# Patient Record
Sex: FEMALE | Race: WHITE | NOT HISPANIC OR LATINO | Employment: FULL TIME | URBAN - METROPOLITAN AREA
[De-identification: names, ages, dates, MRNs, and addresses within clinical notes are randomized per-mention and may not be internally consistent; named-entity substitution may affect disease eponyms.]

---
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
## 2021-02-09 ENCOUNTER — TELEPHONE (OUTPATIENT)
Dept: SCHEDULING | Facility: IMAGING CENTER | Age: 21
End: 2021-02-09

## 2021-02-09 ENCOUNTER — TELEPHONE (OUTPATIENT)
Dept: MEDICAL GROUP | Facility: PHYSICIAN GROUP | Age: 21
End: 2021-02-09

## 2021-02-09 NOTE — TELEPHONE ENCOUNTER
Future Appointments       Provider Department Center    2/11/2021 3:00 PM VERO Norman [facility]        NEW PATIENT VISIT PRE-VISIT PLANNING    1.  E[employer] Patient is checked in Patient Demographics?Yes    2.  Immunizations were updated in Epic using Reconcile Outside Information activity? Yes         3.  Is this appointment scheduled as a Hospital Follow-Up? No    4.  Patient is due for the following Health Maintenance Topics:   Health Maintenance Due   Topic Date Due   • CHLAMYDIA SCREENING  2000   • IMM PNEUMOCOCCAL VACCINE: 0-64 Years (1 of 1 - PPSV23) 04/04/2007   • IMM MENINGOCOCCAL B VACCINE HEALTHY PATIENTS AGED 16 TO 23 (1 of 2 - MenB Trumenba 2-Dose Series) 04/04/2017   • IMM INFLUENZA (1) 09/01/2020     5.  Reviewed/Updated the following with patient:       •   Preferred Pharmacy? No       •   Preferred Lab? No       •   Preferred Communication? No       •   Allergies? No       •   Medications? NO       •   Social History? No       •   Family History (document living status of immediate family members and if + hx of  cancer, diabetes, hypertension, hyperlipidemia, heart attack, stroke) No    6.  Updated Care Team?       •   DME Company (gait device, O2, CPAP, etc.) NO       •   Other Specialists (eye doctor, derm, GYN, cardiology, endo, etc): NO    7.  AHA (Puls8) form printed for Provider? N/A     Left 3 VM to complete PVP, Unable to complete PVP at this time

## 2021-02-11 ENCOUNTER — TELEMEDICINE (OUTPATIENT)
Dept: MEDICAL GROUP | Facility: PHYSICIAN GROUP | Age: 21
End: 2021-02-11
Payer: OTHER GOVERNMENT

## 2021-02-11 VITALS — WEIGHT: 165 LBS | TEMPERATURE: 97.8 F | HEIGHT: 65 IN | BODY MASS INDEX: 27.49 KG/M2

## 2021-02-11 DIAGNOSIS — N93.8 DYSFUNCTIONAL UTERINE BLEEDING: ICD-10-CM

## 2021-02-11 DIAGNOSIS — E78.5 DYSLIPIDEMIA: ICD-10-CM

## 2021-02-11 DIAGNOSIS — Z76.89 ESTABLISHING CARE WITH NEW DOCTOR, ENCOUNTER FOR: ICD-10-CM

## 2021-02-11 DIAGNOSIS — Z91.018 ALLERGY TO BANANA: ICD-10-CM

## 2021-02-11 DIAGNOSIS — Z02.89 ENCOUNTER FOR COMPLETION OF FORM WITH PATIENT: ICD-10-CM

## 2021-02-11 DIAGNOSIS — Z00.00 ROUTINE HEALTH MAINTENANCE: ICD-10-CM

## 2021-02-11 DIAGNOSIS — E05.90 HYPERTHYROIDISM: ICD-10-CM

## 2021-02-11 PROBLEM — J30.9 RHINITIS, ALLERGIC: Status: RESOLVED | Noted: 2018-07-02 | Resolved: 2021-02-11

## 2021-02-11 PROBLEM — Z87.892 PERSONAL HISTORY OF ANAPHYLAXIS: Status: RESOLVED | Noted: 2018-08-03 | Resolved: 2021-02-11

## 2021-02-11 PROBLEM — H93.90 DISORDER OF EAR: Status: RESOLVED | Noted: 2018-02-23 | Resolved: 2021-02-11

## 2021-02-11 PROBLEM — Z30.430 ENCOUNTER FOR INSERTION OF INTRAUTERINE CONTRACEPTIVE DEVICE: Status: RESOLVED | Noted: 2018-03-08 | Resolved: 2021-02-11

## 2021-02-11 PROBLEM — Z86.59 HISTORY OF BEHAVIOR PROBLEM: Status: RESOLVED | Noted: 2018-09-14 | Resolved: 2021-02-11

## 2021-02-11 PROCEDURE — 99214 OFFICE O/P EST MOD 30 MIN: CPT | Mod: 95 | Performed by: NURSE PRACTITIONER

## 2021-02-11 RX ORDER — EPINEPHRINE 0.3 MG/.3ML
0.3 INJECTION SUBCUTANEOUS ONCE
Qty: 1 EACH | Refills: 0 | Status: SHIPPED | OUTPATIENT
Start: 2021-02-11 | End: 2021-02-22 | Stop reason: SDUPTHER

## 2021-02-11 ASSESSMENT — FIBROSIS 4 INDEX: FIB4 SCORE: 0.25

## 2021-02-11 NOTE — ASSESSMENT & PLAN NOTE
New to examiner, chronic for patient.  Patient reports that she had an IUD removed in September 2019.  Since then she has been having irregular bleeding.  She is requesting lab work.

## 2021-02-11 NOTE — LETTER
February 11, 2021         Patient: Lupillo Coello   YOB: 2000   Date of Visit: 2/11/2021           To Whom it May Concern:    Lupillo Coello was seen in my clinic on 2/11/2021. She may return to work on light duty until further notice.    If you have any questions or concerns, please don't hesitate to call.        Sincerely,           ELIZABETH Norman.  Electronically Signed

## 2021-02-11 NOTE — ASSESSMENT & PLAN NOTE
New to examiner, chronic for patient.  Patient has not taken medication for this issue.  Reports father has a history of hyperthyroidism.  Due for updated labs.

## 2021-02-11 NOTE — PROGRESS NOTES
Virtual Visit: New Patient   This visit was conducted via [employer] using secure and encrypted videoconferencing technology. The patient was in a private location in the state of [place].    The patient's identity was confirmed and verbal consent was obtained for this virtual visit.    Subjective:     CC:   Chief Complaint   Patient presents with   • [facility]       [facility] is a 20 y.o. female presenting to establish care and to discuss the evaluation and management of:    Hyperthyroidism  New to examiner, chronic for patient.  Patient has not taken medication for this issue.  Reports father has a history of hyperthyroidism.  Due for updated labs.    Dysfunctional uterine bleeding  New to examiner, chronic for patient.  Patient reports that she had an IUD removed in September 2019.  Since then she has been having irregular bleeding.  She is requesting lab work.    Dyslipidemia  New to the examiner, chronic for patient.  Patient has a history of dyslipidemia, not currently on medication.  Due for updated labs.      ROS  See HPI  Constitutional: Negative for fever, chills and malaise/fatigue.   HENT: Negative for congestion.    Eyes: Negative for pain.   Respiratory: Negative for cough and shortness of breath.    Cardiovascular: Negative for leg swelling.   Gastrointestinal: Negative for nausea, vomiting, abdominal pain and diarrhea.   Genitourinary: Negative for dysuria and hematuria.   Skin: Negative for rash.   Neurological: Negative for dizziness, focal weakness and headaches.   Endo/Heme/Allergies: Does not bruise/bleed easily.   Psychiatric/Behavioral: Negative for depression.  The patient is not nervous/anxious.      Allergies   Allergen Reactions   • Banana Anaphylaxis       Current medicines (including changes today)  Current Outpatient Medications   Medication Sig Dispense Refill   • EPINEPHrine (EPIPEN 2-FIDELIA) 0.3 MG/0.3ML Solution Auto-injector solution for injection Inject 0.3 mL into  "the shoulder, thigh, or buttocks one time for 1 dose. 1 Each 0     No current facility-administered medications for this visit.       She  has a past medical history of Anxiety, Bipolar 1 disorder (HCC), Depression, History of behavior problem (9/14/2018), Hyperlipidemia, Personal history of anaphylaxis (8/3/2018), and PTSD (post-traumatic stress disorder).  She  has a past surgical history that includes dental extraction(s).      Family History   Problem Relation Age of Onset   • Psychiatric Illness Mother    • Thyroid Father         hyperthyroid   • No Known Problems Sister    • No Known Problems Brother    • Heart Disease Maternal Grandmother         open heart   • No Known Problems Maternal Grandfather    • No Known Problems Paternal Grandmother    • No Known Problems Paternal Grandfather    • No Known Problems Brother    • No Known Problems Sister      Family Status   Relation Name Status   • Mo  Alive   • Fa  Alive   • Sis  Alive   • Bro  Alive   • MGMo  Alive   • MGFa  Alive   • PGMo  Alive   • PGFa  Alive   • Bro  Alive   • Sis  Alive       Patient Active Problem List    Diagnosis Date Noted   • Dyslipidemia 02/11/2021   • Anxiety state 09/14/2018   • Oppositional defiant disorder 09/14/2018   • Post traumatic stress disorder 09/14/2018   • Hyperthyroidism 03/27/2018   • Carpal tunnel syndrome, bilateral upper limbs 01/19/2018   • Dysfunctional uterine bleeding 01/19/2018   • Bipolar 1 disorder (HCC) 11/03/2016          Objective:   Temp 36.6 °C (97.8 °F) (Temporal)   Ht 1.651 m (5' 5\")   Wt 74.8 kg (165 lb)   LMP 01/11/2021   BMI 27.46 kg/m²     Physical Exam:  Constitutional: Alert, no distress, well-groomed.  Skin: No rashes in visible areas.  Eye: Round. Conjunctiva clear, lids normal. No icterus.   ENMT: Lips pink without lesions, good dentition, moist mucous membranes. Phonation normal.  Neck: No masses, no thyromegaly. Moves freely without pain.  Respiratory: Unlabored respiratory effort, no cough " or audible wheeze  Psych: Alert and oriented x3, normal affect and mood.       Assessment and Plan:   The following treatment plan was discussed:     1. Establishing care with new doctor, encounter for    2. Hyperthyroidism  New to examiner, chronic for patient.  Due for updated labs.  - TSH WITH REFLEX TO FT4; Future    3. Dyslipidemia  New to examiner, chronic for patient.  Due for updated labs.  - Comp Metabolic Panel; Future  - Lipid Profile; Future    4. 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  Reports on anaphylactic reaction.  She has had to use an EpiPen in the past and currently does not have one.  Order placed.  - EPINEPHrine (EPIPEN 2-FIDELIA) 0.3 MG/0.3ML Solution Auto-injector solution for injection; Inject 0.3 mL into the shoulder, thigh, or buttocks one time for 1 dose.  Dispense: 1 Each; Refill: 0    5. Dysfunctional uterine bleeding  New to examiner, chronic for patient.  Due for updated labs.   - FSH/LH; Future  - TSH WITH REFLEX TO FT4; Future  - CBC WITH DIFFERENTIAL; Future  - Comp Metabolic Panel; Future    6. Routine health maintenance  - TSH WITH REFLEX TO FT4; Future  - CBC WITH DIFFERENTIAL; Future  - Comp Metabolic Panel; Future  - Lipid Profile; Future    7. Encounter for completion of form with patient  Patient works at [employer].  She reports having pain with numbness in her hands and she is unable to do heavy lifting.  She is requesting a letter for light duty.  Patient notified that if she needs Workmen's Comp., that she needs to go to urgent care.  If she needs [place] paperwork done, she will need a separate appointment.    Follow-up: Return for Follow up Labs.       Please note that this dictation was created using voice recognition software. I have worked with consultants from the vendor as well as technical experts from [employer] to optimize the interface. I have made every reasonable attempt to correct obvious errors, but I expect that there are errors of grammar and  possibly content that I did not discover before finalizing the note.

## 2021-02-11 NOTE — ASSESSMENT & PLAN NOTE
New to the examiner, chronic for patient.  Patient has a history of dyslipidemia, not currently on medication.  Due for updated labs.

## 2021-02-22 ENCOUNTER — TELEPHONE (OUTPATIENT)
Dept: MEDICAL GROUP | Facility: PHYSICIAN GROUP | Age: 21
End: 2021-02-22

## 2021-02-22 DIAGNOSIS — Z91.018 ALLERGY TO BANANA: ICD-10-CM

## 2021-02-22 RX ORDER — EPINEPHRINE 0.3 MG/.3ML
0.3 INJECTION SUBCUTANEOUS ONCE
Qty: 1 EACH | Refills: 0 | Status: SHIPPED | OUTPATIENT
Start: 2021-02-22 | End: 2021-02-22

## 2021-02-22 NOTE — PROGRESS NOTES
Requested Prescriptions     Signed Prescriptions Disp Refills   • EPINEPHrine (EPIPEN 2-FIDELIA) 0.3 MG/0.3ML Solution Auto-injector solution for injection 1 Each 0     Sig: Inject 0.3 mL into the shoulder, thigh, or buttocks one time for 1 dose.       ELIZABETH Norman.    Patient requesting EpiPen sent to a different pharmacy.

## 2021-02-22 NOTE — TELEPHONE ENCOUNTER
1. Caller Name:Isai Coello                             Call Back Number: [phone] (home)         How would the patient prefer to be contacted with a response: Phone call do NOT leave a detailed message    Lupillo is requesting to have her Epi Pen to be moved to [facility] in [place], please advise

## 2021-02-24 ENCOUNTER — HOSPITAL ENCOUNTER (OUTPATIENT)
Dept: LAB | Facility: MEDICAL CENTER | Age: 21
End: 2021-02-24
Attending: NURSE PRACTITIONER
Payer: OTHER GOVERNMENT

## 2021-02-24 DIAGNOSIS — E78.5 DYSLIPIDEMIA: ICD-10-CM

## 2021-02-24 DIAGNOSIS — N93.8 DYSFUNCTIONAL UTERINE BLEEDING: ICD-10-CM

## 2021-02-24 DIAGNOSIS — Z00.00 ROUTINE HEALTH MAINTENANCE: ICD-10-CM

## 2021-02-24 DIAGNOSIS — E05.90 HYPERTHYROIDISM: ICD-10-CM

## 2021-02-24 LAB
ALBUMIN SERPL BCP-MCNC: 4.5 G/DL (ref 3.2–4.9)
ALBUMIN/GLOB SERPL: 1.6 G/DL
ALP SERPL-CCNC: 77 U/L (ref 30–99)
ALT SERPL-CCNC: 22 U/L (ref 2–50)
ANION GAP SERPL CALC-SCNC: 10 MMOL/L (ref 7–16)
AST SERPL-CCNC: 21 U/L (ref 12–45)
BASOPHILS # BLD AUTO: 0.5 % (ref 0–1.8)
BASOPHILS # BLD: 0.03 K/UL (ref 0–0.12)
BILIRUB SERPL-MCNC: 0.4 MG/DL (ref 0.1–1.5)
BUN SERPL-MCNC: 13 MG/DL (ref 8–22)
CALCIUM SERPL-MCNC: 9.1 MG/DL (ref 8.4–10.2)
CHLORIDE SERPL-SCNC: 104 MMOL/L (ref 96–112)
CHOLEST SERPL-MCNC: 176 MG/DL (ref 100–199)
CO2 SERPL-SCNC: 23 MMOL/L (ref 20–33)
CREAT SERPL-MCNC: 0.54 MG/DL (ref 0.5–1.4)
EOSINOPHIL # BLD AUTO: 0.07 K/UL (ref 0–0.51)
EOSINOPHIL NFR BLD: 1.1 % (ref 0–6.9)
ERYTHROCYTE [DISTWIDTH] IN BLOOD BY AUTOMATED COUNT: 40.1 FL (ref 35.9–50)
FASTING STATUS PATIENT QL REPORTED: NORMAL
GLOBULIN SER CALC-MCNC: 2.9 G/DL (ref 1.9–3.5)
GLUCOSE SERPL-MCNC: 91 MG/DL (ref 65–99)
HCT VFR BLD AUTO: 42.3 % (ref 37–47)
HDLC SERPL-MCNC: 40 MG/DL
HGB BLD-MCNC: 14.2 G/DL (ref 12–16)
IMM GRANULOCYTES # BLD AUTO: 0.02 K/UL (ref 0–0.11)
IMM GRANULOCYTES NFR BLD AUTO: 0.3 % (ref 0–0.9)
LDLC SERPL CALC-MCNC: 116 MG/DL
LYMPHOCYTES # BLD AUTO: 1.95 K/UL (ref 1–4.8)
LYMPHOCYTES NFR BLD: 31.1 % (ref 22–41)
MCH RBC QN AUTO: 30.3 PG (ref 27–33)
MCHC RBC AUTO-ENTMCNC: 33.6 G/DL (ref 33.6–35)
MCV RBC AUTO: 90.4 FL (ref 81.4–97.8)
MONOCYTES # BLD AUTO: 0.41 K/UL (ref 0–0.85)
MONOCYTES NFR BLD AUTO: 6.5 % (ref 0–13.4)
NEUTROPHILS # BLD AUTO: 3.8 K/UL (ref 2–7.15)
NEUTROPHILS NFR BLD: 60.5 % (ref 44–72)
NRBC # BLD AUTO: 0 K/UL
NRBC BLD-RTO: 0 /100 WBC
PLATELET # BLD AUTO: 263 K/UL (ref 164–446)
PMV BLD AUTO: 9.2 FL (ref 9–12.9)
POTASSIUM SERPL-SCNC: 3.7 MMOL/L (ref 3.6–5.5)
PROT SERPL-MCNC: 7.4 G/DL (ref 6–8.2)
RBC # BLD AUTO: 4.68 M/UL (ref 4.2–5.4)
SODIUM SERPL-SCNC: 137 MMOL/L (ref 135–145)
TRIGL SERPL-MCNC: 101 MG/DL (ref 0–149)
TSH SERPL DL<=0.005 MIU/L-ACNC: 0.38 UIU/ML (ref 0.38–5.33)
WBC # BLD AUTO: 6.3 K/UL (ref 4.8–10.8)

## 2021-02-24 PROCEDURE — 80061 LIPID PANEL: CPT

## 2021-02-24 PROCEDURE — 83002 ASSAY OF GONADOTROPIN (LH): CPT

## 2021-02-24 PROCEDURE — 85025 COMPLETE CBC W/AUTO DIFF WBC: CPT

## 2021-02-24 PROCEDURE — 83001 ASSAY OF GONADOTROPIN (FSH): CPT

## 2021-02-24 PROCEDURE — 36415 COLL VENOUS BLD VENIPUNCTURE: CPT

## 2021-02-24 PROCEDURE — 84443 ASSAY THYROID STIM HORMONE: CPT

## 2021-02-24 PROCEDURE — 80053 COMPREHEN METABOLIC PANEL: CPT

## 2021-02-25 LAB
FSH SERPL-ACNC: 4.4 MIU/ML
LH SERPL-ACNC: 4.7 IU/L

## 2021-05-03 ENCOUNTER — HOSPITAL ENCOUNTER (EMERGENCY)
Facility: MEDICAL CENTER | Age: 21
End: 2021-05-03
Attending: EMERGENCY MEDICINE
Payer: OTHER GOVERNMENT

## 2021-05-03 ENCOUNTER — APPOINTMENT (OUTPATIENT)
Dept: RADIOLOGY | Facility: MEDICAL CENTER | Age: 21
End: 2021-05-03
Attending: EMERGENCY MEDICINE
Payer: OTHER GOVERNMENT

## 2021-05-03 VITALS
WEIGHT: 161.16 LBS | DIASTOLIC BLOOD PRESSURE: 67 MMHG | HEART RATE: 70 BPM | SYSTOLIC BLOOD PRESSURE: 125 MMHG | TEMPERATURE: 97.7 F | BODY MASS INDEX: 25.9 KG/M2 | OXYGEN SATURATION: 82 % | HEIGHT: 66 IN | RESPIRATION RATE: 16 BRPM

## 2021-05-03 DIAGNOSIS — I73.00 RAYNAUD'S PHENOMENON WITHOUT GANGRENE: ICD-10-CM

## 2021-05-03 PROCEDURE — 99283 EMERGENCY DEPT VISIT LOW MDM: CPT

## 2021-05-03 PROCEDURE — 93922 UPR/L XTREMITY ART 2 LEVELS: CPT

## 2021-05-03 ASSESSMENT — FIBROSIS 4 INDEX: FIB4 SCORE: 0.34

## 2021-05-03 ASSESSMENT — PAIN DESCRIPTION - DESCRIPTORS: DESCRIPTORS: OTHER (COMMENT)
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
## 2021-05-03 NOTE — ED NOTES
Discharge paperwork given to pt, all belongings accounted for, pt able to ambulate with steady gait to the ER exit
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
## 2021-12-07 ENCOUNTER — OFFICE VISIT (OUTPATIENT)
Dept: MEDICAL GROUP | Facility: PHYSICIAN GROUP | Age: 21
End: 2021-12-07
Payer: COMMERCIAL

## 2021-12-07 VITALS
TEMPERATURE: 97.9 F | DIASTOLIC BLOOD PRESSURE: 64 MMHG | HEIGHT: 65 IN | SYSTOLIC BLOOD PRESSURE: 120 MMHG | OXYGEN SATURATION: 97 % | HEART RATE: 107 BPM | BODY MASS INDEX: 25.33 KG/M2 | WEIGHT: 152 LBS

## 2021-12-07 DIAGNOSIS — F31.9 BIPOLAR 1 DISORDER (HCC): ICD-10-CM

## 2021-12-07 DIAGNOSIS — I73.00 RAYNAUD'S PHENOMENON WITHOUT GANGRENE: ICD-10-CM

## 2021-12-07 DIAGNOSIS — F43.10 POST TRAUMATIC STRESS DISORDER: ICD-10-CM

## 2021-12-07 DIAGNOSIS — M62.830 BACK SPASM: ICD-10-CM

## 2021-12-07 DIAGNOSIS — F91.3 OPPOSITIONAL DEFIANT DISORDER: ICD-10-CM

## 2021-12-07 DIAGNOSIS — Z91.018 ALLERGY TO BANANA: ICD-10-CM

## 2021-12-07 DIAGNOSIS — F41.1 ANXIETY STATE: ICD-10-CM

## 2021-12-07 LAB
INT CON NEG: NORMAL
INT CON POS: NORMAL
POC URINE PREGNANCY TEST: NEGATIVE

## 2021-12-07 PROCEDURE — 99214 OFFICE O/P EST MOD 30 MIN: CPT | Performed by: NURSE PRACTITIONER

## 2021-12-07 PROCEDURE — 81025 URINE PREGNANCY TEST: CPT | Performed by: NURSE PRACTITIONER

## 2021-12-07 RX ORDER — AMLODIPINE BESYLATE 5 MG/1
5 TABLET ORAL DAILY
Qty: 90 TABLET | Refills: 0 | Status: SHIPPED | OUTPATIENT
Start: 2021-12-07 | End: 2022-01-27

## 2021-12-07 RX ORDER — EPINEPHRINE 0.3 MG/.3ML
0.3 INJECTION SUBCUTANEOUS ONCE
Qty: 2 EACH | Refills: 2 | Status: SHIPPED | OUTPATIENT
Start: 2021-12-07 | End: 2021-12-07

## 2021-12-07 RX ORDER — CHLORAL HYDRATE 500 MG
1000 CAPSULE ORAL
COMMUNITY

## 2021-12-07 RX ORDER — NAPROXEN 500 MG/1
500 TABLET ORAL 2 TIMES DAILY WITH MEALS
COMMUNITY
End: 2021-12-07 | Stop reason: SDUPTHER

## 2021-12-07 RX ORDER — CYCLOBENZAPRINE HCL 5 MG
5 TABLET ORAL NIGHTLY PRN
Qty: 90 TABLET | Refills: 0 | Status: SHIPPED | OUTPATIENT
Start: 2021-12-07

## 2021-12-07 RX ORDER — CYCLOBENZAPRINE HCL 5 MG
5-10 TABLET ORAL 3 TIMES DAILY PRN
COMMUNITY
End: 2021-12-07 | Stop reason: SDUPTHER

## 2021-12-07 RX ORDER — NAPROXEN 500 MG/1
500 TABLET ORAL 2 TIMES DAILY WITH MEALS
Qty: 60 TABLET | Refills: 11 | Status: SHIPPED | OUTPATIENT
Start: 2021-12-07

## 2021-12-07 ASSESSMENT — FIBROSIS 4 INDEX: FIB4 SCORE: 0.34
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
## 2021-12-07 NOTE — LETTER
December 7, 2021         Patient: Lupillo Coello   YOB: 2000   Date of Visit: 12/7/2021           To Whom it May Concern:    Lupillo Coello was seen in my clinic on 12/7/2021. She may continue to work with current accommodations provided. She will follow up with PCP in 2 weeks to review and complete reasonable accomodation paperwork if indicated. Please excuse work missed on: 12/2/2021, 11/25/2021, 11/17-11/20/2021.    If you have any questions or concerns, please don't hesitate to call.        Sincerely,           ELIZABETH Norman.  Electronically Signed
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
## 2021-12-13 ENCOUNTER — TELEPHONE (OUTPATIENT)
Dept: MEDICAL GROUP | Facility: PHYSICIAN GROUP | Age: 21
End: 2021-12-13
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
## 2021-12-13 NOTE — TELEPHONE ENCOUNTER
I called and left a message for the patient that she needed to reschedule her appointment to a virtual appointment this week in order to have Brunlida deal with her multiple issues.  I provided the phone number for Central Scheduling and myself.

## 2021-12-15 ENCOUNTER — TELEMEDICINE (OUTPATIENT)
Dept: MEDICAL GROUP | Facility: PHYSICIAN GROUP | Age: 21
End: 2021-12-15
Payer: COMMERCIAL

## 2021-12-15 VITALS — HEIGHT: 65 IN | WEIGHT: 152 LBS | BODY MASS INDEX: 25.33 KG/M2

## 2021-12-15 DIAGNOSIS — Z02.89 ENCOUNTER FOR COMPLETION OF FORM WITH PATIENT: ICD-10-CM

## 2021-12-15 DIAGNOSIS — I73.00 RAYNAUD'S PHENOMENON WITHOUT GANGRENE: ICD-10-CM

## 2021-12-15 DIAGNOSIS — M62.830 BACK SPASM: ICD-10-CM

## 2021-12-15 DIAGNOSIS — G56.03 CARPAL TUNNEL SYNDROME, BILATERAL UPPER LIMBS: ICD-10-CM

## 2021-12-15 PROCEDURE — 99214 OFFICE O/P EST MOD 30 MIN: CPT | Mod: 95 | Performed by: NURSE PRACTITIONER

## 2021-12-15 ASSESSMENT — FIBROSIS 4 INDEX: FIB4 SCORE: 0.34

## 2021-12-15 NOTE — LETTER
December 15, 2021         Patient: Lupillo Coello   YOB: 2000   Date of Visit: 12/15/2021           To Whom it May Concern:     Lupillo Coello was seen in my clinic on 12/15/2021. Please excuse her absence from work on 12/10/2021 and 12/11/2021. She received COVID 19 vaccine, influenza vaccine and menactra vaccine on 12/8/2021 and experienced a reaction.    If you have any questions or concerns, please don't hesitate to call.        Sincerely,           VEOR Norman  Electronically Signed
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
## 2021-12-16 NOTE — ASSESSMENT & PLAN NOTE
Chronic, ongoing.  Patient started amlodipine 5 mg, initially 2.5 mg daily 1 week ago.  Reports that the bilateral hand and feet coldness and color changes have improved since starting the medication, but she feels that she wakes up in her hands and feet are cold, she is interested in trying half dose twice daily.  She does feel warmer with the medications.  Wanted to note that iron deficiency runs in her family.  She does continue to have aching and numbness in bilateral hands when waking up, she does continue to use bilateral wrist braces while sleeping.
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
## 2022-01-20 ENCOUNTER — HOSPITAL ENCOUNTER (EMERGENCY)
Facility: MEDICAL CENTER | Age: 22
End: 2022-01-20
Attending: STUDENT IN AN ORGANIZED HEALTH CARE EDUCATION/TRAINING PROGRAM
Payer: COMMERCIAL

## 2022-01-20 VITALS
RESPIRATION RATE: 18 BRPM | WEIGHT: 157.19 LBS | BODY MASS INDEX: 26.19 KG/M2 | HEART RATE: 75 BPM | HEIGHT: 65 IN | OXYGEN SATURATION: 98 % | SYSTOLIC BLOOD PRESSURE: 124 MMHG | TEMPERATURE: 98.2 F | DIASTOLIC BLOOD PRESSURE: 80 MMHG

## 2022-01-20 DIAGNOSIS — M79.10 MYALGIA: ICD-10-CM

## 2022-01-20 DIAGNOSIS — R19.7 DIARRHEA OF PRESUMED INFECTIOUS ORIGIN: ICD-10-CM

## 2022-01-20 DIAGNOSIS — N30.00 ACUTE CYSTITIS WITHOUT HEMATURIA: ICD-10-CM

## 2022-01-20 LAB
ALBUMIN SERPL BCP-MCNC: 4.7 G/DL (ref 3.2–4.9)
ALBUMIN/GLOB SERPL: 1.6 G/DL
ALP SERPL-CCNC: 91 U/L (ref 30–99)
ALT SERPL-CCNC: 16 U/L (ref 2–50)
ANION GAP SERPL CALC-SCNC: 13 MMOL/L (ref 7–16)
APPEARANCE UR: CLEAR
AST SERPL-CCNC: 18 U/L (ref 12–45)
BACTERIA #/AREA URNS HPF: ABNORMAL /HPF
BASOPHILS # BLD AUTO: 0.5 % (ref 0–1.8)
BASOPHILS # BLD: 0.04 K/UL (ref 0–0.12)
BILIRUB SERPL-MCNC: 0.2 MG/DL (ref 0.1–1.5)
BILIRUB UR QL STRIP.AUTO: NEGATIVE
BUN SERPL-MCNC: 11 MG/DL (ref 8–22)
CALCIUM SERPL-MCNC: 9.5 MG/DL (ref 8.5–10.5)
CHLORIDE SERPL-SCNC: 104 MMOL/L (ref 96–112)
CO2 SERPL-SCNC: 22 MMOL/L (ref 20–33)
COLOR UR: YELLOW
CREAT SERPL-MCNC: 0.72 MG/DL (ref 0.5–1.4)
EOSINOPHIL # BLD AUTO: 0.1 K/UL (ref 0–0.51)
EOSINOPHIL NFR BLD: 1.2 % (ref 0–6.9)
EPI CELLS #/AREA URNS HPF: ABNORMAL /HPF
ERYTHROCYTE [DISTWIDTH] IN BLOOD BY AUTOMATED COUNT: 41.7 FL (ref 35.9–50)
GLOBULIN SER CALC-MCNC: 3 G/DL (ref 1.9–3.5)
GLUCOSE SERPL-MCNC: 95 MG/DL (ref 65–99)
GLUCOSE UR STRIP.AUTO-MCNC: NEGATIVE MG/DL
HCG SERPL QL: NEGATIVE
HCT VFR BLD AUTO: 43.2 % (ref 37–47)
HGB BLD-MCNC: 14.7 G/DL (ref 12–16)
HYALINE CASTS #/AREA URNS LPF: ABNORMAL /LPF
IMM GRANULOCYTES # BLD AUTO: 0.02 K/UL (ref 0–0.11)
IMM GRANULOCYTES NFR BLD AUTO: 0.2 % (ref 0–0.9)
KETONES UR STRIP.AUTO-MCNC: NEGATIVE MG/DL
LEUKOCYTE ESTERASE UR QL STRIP.AUTO: ABNORMAL
LIPASE SERPL-CCNC: 35 U/L (ref 11–82)
LYMPHOCYTES # BLD AUTO: 1.72 K/UL (ref 1–4.8)
LYMPHOCYTES NFR BLD: 21 % (ref 22–41)
MCH RBC QN AUTO: 30.8 PG (ref 27–33)
MCHC RBC AUTO-ENTMCNC: 34 G/DL (ref 33.6–35)
MCV RBC AUTO: 90.6 FL (ref 81.4–97.8)
MICRO URNS: ABNORMAL
MONOCYTES # BLD AUTO: 0.63 K/UL (ref 0–0.85)
MONOCYTES NFR BLD AUTO: 7.7 % (ref 0–13.4)
NEUTROPHILS # BLD AUTO: 5.7 K/UL (ref 2–7.15)
NEUTROPHILS NFR BLD: 69.4 % (ref 44–72)
NITRITE UR QL STRIP.AUTO: POSITIVE
NRBC # BLD AUTO: 0 K/UL
NRBC BLD-RTO: 0 /100 WBC
PH UR STRIP.AUTO: 6.5 [PH] (ref 5–8)
PLATELET # BLD AUTO: 324 K/UL (ref 164–446)
PMV BLD AUTO: 9.3 FL (ref 9–12.9)
POTASSIUM SERPL-SCNC: 4.2 MMOL/L (ref 3.6–5.5)
PROT SERPL-MCNC: 7.7 G/DL (ref 6–8.2)
PROT UR QL STRIP: NEGATIVE MG/DL
RBC # BLD AUTO: 4.77 M/UL (ref 4.2–5.4)
RBC # URNS HPF: ABNORMAL /HPF
RBC UR QL AUTO: NEGATIVE
SODIUM SERPL-SCNC: 139 MMOL/L (ref 135–145)
SP GR UR STRIP.AUTO: 1.02
UROBILINOGEN UR STRIP.AUTO-MCNC: 0.2 MG/DL
WBC # BLD AUTO: 8.2 K/UL (ref 4.8–10.8)
WBC #/AREA URNS HPF: ABNORMAL /HPF

## 2022-01-20 PROCEDURE — 84703 CHORIONIC GONADOTROPIN ASSAY: CPT

## 2022-01-20 PROCEDURE — 80053 COMPREHEN METABOLIC PANEL: CPT

## 2022-01-20 PROCEDURE — 85025 COMPLETE CBC W/AUTO DIFF WBC: CPT

## 2022-01-20 PROCEDURE — 81001 URINALYSIS AUTO W/SCOPE: CPT

## 2022-01-20 PROCEDURE — 0240U HCHG SARS-COV-2 COVID-19 NFCT DS RESP RNA 3 TRGT MIC: CPT

## 2022-01-20 PROCEDURE — 99284 EMERGENCY DEPT VISIT MOD MDM: CPT

## 2022-01-20 PROCEDURE — C9803 HOPD COVID-19 SPEC COLLECT: HCPCS | Performed by: STUDENT IN AN ORGANIZED HEALTH CARE EDUCATION/TRAINING PROGRAM

## 2022-01-20 PROCEDURE — 83690 ASSAY OF LIPASE: CPT

## 2022-01-20 RX ORDER — SODIUM CHLORIDE 9 MG/ML
1000 INJECTION, SOLUTION INTRAVENOUS ONCE
Status: DISCONTINUED | OUTPATIENT
Start: 2022-01-20 | End: 2022-01-21 | Stop reason: HOSPADM

## 2022-01-20 RX ORDER — NITROFURANTOIN 25; 75 MG/1; MG/1
100 CAPSULE ORAL 2 TIMES DAILY
Qty: 14 CAPSULE | Refills: 0 | Status: SHIPPED | OUTPATIENT
Start: 2022-01-20 | End: 2022-01-27

## 2022-01-20 ASSESSMENT — FIBROSIS 4 INDEX: FIB4 SCORE: 0.34

## 2022-01-21 LAB
FLUAV RNA SPEC QL NAA+PROBE: NEGATIVE
FLUBV RNA SPEC QL NAA+PROBE: NEGATIVE
SARS-COV-2 RNA RESP QL NAA+PROBE: NOTDETECTED
SPECIMEN SOURCE: NORMAL

## 2022-01-21 NOTE — ED NOTES
covid swab obtained. Pt provided with discharge instructions regarding follow up, prescriptions and reasons to return to the ER. Pt verbalized understanding.
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
## 2022-01-21 NOTE — DISCHARGE INSTRUCTIONS
You likely have COVID-19 or some other virus, your test will return in the next 24 hours. You may receive a call, however, given the high amount of cases we are currently seeing, we encourage you to download and check [facility] for your results.     Stay hydrated and drink plenty of water.  Return to the emergency department if your symptoms worsen.

## 2022-01-21 NOTE — ED TRIAGE NOTES
"Chief Complaint   Patient presents with   • Low Back Pain   • Breast Pain   • Diarrhea   • Abdominal Pain   • Body Aches   • Nausea     BP (!) 99/73   Pulse 74   Temp 35.8 °C (96.5 °F)   Resp 18   Ht 1.651 m (5' 5\")   Wt 71.3 kg (157 lb 3 oz)   SpO2 99%   BMI 26.16 kg/m²     Pt states onset of symptoms was 3 days ago. Pt ambulatory to triage, protocol ordered.   "

## 2022-01-24 ENCOUNTER — TELEPHONE (OUTPATIENT)
Dept: MEDICAL GROUP | Facility: PHYSICIAN GROUP | Age: 22
End: 2022-01-24

## 2022-01-24 NOTE — TELEPHONE ENCOUNTER
I called the patient in response to her message and Brunilda's response to same.  I reached voicemail  And sent the patient a [employer] message telling her to  Please schedule a virtual appointment to discuss a work note and  For Brunilda to review the  ER visit.

## 2022-01-27 ENCOUNTER — TELEMEDICINE (OUTPATIENT)
Dept: MEDICAL GROUP | Facility: PHYSICIAN GROUP | Age: 22
End: 2022-01-27
Payer: COMMERCIAL

## 2022-01-27 VITALS — WEIGHT: 152 LBS | BODY MASS INDEX: 25.33 KG/M2 | HEIGHT: 65 IN

## 2022-01-27 DIAGNOSIS — R19.7 DIARRHEA, UNSPECIFIED TYPE: ICD-10-CM

## 2022-01-27 DIAGNOSIS — G56.03 CARPAL TUNNEL SYNDROME, BILATERAL UPPER LIMBS: ICD-10-CM

## 2022-01-27 DIAGNOSIS — I73.00 RAYNAUD'S PHENOMENON WITHOUT GANGRENE: ICD-10-CM

## 2022-01-27 PROCEDURE — 99214 OFFICE O/P EST MOD 30 MIN: CPT | Mod: 95 | Performed by: NURSE PRACTITIONER

## 2022-01-27 RX ORDER — AMLODIPINE BESYLATE 2.5 MG/1
2.5 TABLET ORAL DAILY
Qty: 30 TABLET | Refills: 0
Start: 2022-01-27 | End: 2022-03-16 | Stop reason: SINTOL

## 2022-01-27 ASSESSMENT — FIBROSIS 4 INDEX: FIB4 SCORE: .2777777777777777778

## 2022-01-27 NOTE — ASSESSMENT & PLAN NOTE
Chronic, ongoing. Reports that she was experiencing hot flashes and nausea while at work when she was taking amlodipine 2.5 mg twice daily. She has not been taking the medication due to symptoms as well as recently having a GI illness. Reports that the cold and color changes had improved, but she continues to have numbness and tingling.

## 2022-01-27 NOTE — ASSESSMENT & PLAN NOTE
Chronic, ongoing problem for the patient. Continues to have numbness and tingling in bilateral hands digits 2, 3, 4. She reports compliance with bilateral wrist splints. She has not had EMG or NCS in the past.

## 2022-01-27 NOTE — LETTER
January 27, 2022         Patient: Lupillo Coello   YOB: 2000   Date of Visit: 1/27/2022           To Whom it May Concern:    Lupillo Coello was seen in my clinic on 1/27/2022 and evaluated in the ER on 1/20/2022. Please excuse absences for 1/15/2022, 1/20/2022, and 1/21/2022.    If you have any questions or concerns, please don't hesitate to call.        Sincerely,           VERO Norman  Electronically Signed

## 2022-01-27 NOTE — ASSESSMENT & PLAN NOTE
"New to examiner.  Patient reports that she developed new \"explosive diarrhea\" that started while at work.  Due to symptoms, she called in sick on 1/15/2022 and was seen in the emergency room on 1/20/2022.  She had Covid testing while in the emergency room and this was negative, she is vaccinated for Covid.  She was given IV fluids for dehydration and prescribed antibiotics for acute cystitis without hematuria.  Reports that she started the antibiotics last night.  She has been staying hydrated with water, Gatorade/Powerade.  States that the diarrhea was every 5 to 10 minutes, it has significantly improved.  She did find out that other coworkers had developed similar symptoms.  She is requesting a work note for 3 days of work missed.  "

## 2022-01-27 NOTE — PROGRESS NOTES
"Virtual Visit: Established Patient   This visit was conducted via Zoom using secure and encrypted videoconferencing technology.   The patient was in a private location in the state of [place].    The patient's identity was confirmed and verbal consent was obtained for this virtual visit.    Subjective:   CC:   Chief Complaint   Patient presents with   • Requesting Labs   • Paperwork     Needs doctor note     Lupillo Coello is a 21 y.o. female presenting for evaluation and management of:    Carpal tunnel syndrome, bilateral upper limbs  Chronic, ongoing problem for the patient. Continues to have numbness and tingling in bilateral hands digits 2, 3, 4. She reports compliance with bilateral wrist splints. She has not had EMG or NCS in the past.     Raynaud phenomenon  Chronic, ongoing. Reports that she was experiencing hot flashes and nausea while at work when she was taking amlodipine 2.5 mg twice daily. She has not been taking the medication due to symptoms as well as recently having a GI illness. Reports that the cold and color changes had improved, but she continues to have numbness and tingling.    Diarrhea  New to examiner.  Patient reports that she developed new \"explosive diarrhea\" that started while at work.  Due to symptoms, she called in sick on 1/15/2022 and was seen in the emergency room on 1/20/2022.  She had Covid testing while in the emergency room and this was negative, she is vaccinated for Covid.  She was given IV fluids for dehydration and prescribed antibiotics for acute cystitis without hematuria.  Reports that she started the antibiotics last night.  She has been staying hydrated with water, Gatorade/Powerade.  States that the diarrhea was every 5 to 10 minutes, it has significantly improved.  She did find out that other coworkers had developed similar symptoms.  She is requesting a work note for 3 days of work missed.     ROS   See HPI    Current medicines (including changes " "today)  Current Outpatient Medications   Medication Sig Dispense Refill   • nitrofurantoin (MACROBID) 100 MG Cap Take 1 Capsule by mouth 2 times a day for 7 days. 14 Capsule 0   • MELATONIN PO Take  by mouth.     • amLODIPine (NORVASC) 5 MG Tab Take 1 Tablet by mouth every day. Start with 1/2 pill a day for one week, if tolerating increase to one pill per day. 90 Tablet 0   • cyclobenzaprine (FLEXERIL) 5 mg tablet Take 1 Tablet by mouth at bedtime as needed for Muscle Spasms. 90 Tablet 0   • naproxen (NAPROSYN) 500 MG Tab Take 1 Tablet by mouth 2 times a day with meals. 60 Tablet 11   • Omega-3 Fatty Acids (FISH OIL) 1000 MG Cap capsule Take 1,000 mg by mouth 3 times a day with meals.       No current facility-administered medications for this visit.       Patient Active Problem List    Diagnosis Date Noted   • Diarrhea 01/27/2022   • Raynaud phenomenon 12/07/2021   • Back spasm 12/07/2021   • Dyslipidemia 02/11/2021   • Anxiety state 09/14/2018   • Oppositional defiant disorder 09/14/2018   • Post traumatic stress disorder 09/14/2018   • Hyperthyroidism 03/27/2018   • Carpal tunnel syndrome, bilateral upper limbs 01/19/2018   • Dysfunctional uterine bleeding 01/19/2018   • Bipolar 1 disorder (HCC) 11/03/2016      Objective:   Ht 1.651 m (5' 5\")   Wt 68.9 kg (152 lb) Comment: pt state  BMI 25.29 kg/m²     Physical Exam:  Constitutional: Alert, no distress, well-groomed.  Skin: No rashes in visible areas.  Eye: Round. Conjunctiva clear, lids normal. No icterus.   ENMT: Lips pink without lesions, good dentition, moist mucous membranes. Phonation normal.  Neck: No masses, no thyromegaly. Moves freely without pain.  Respiratory: Unlabored respiratory effort, no cough or audible wheeze  Psych: Alert and oriented x3, normal affect and mood.     Assessment and Plan:   The following treatment plan was discussed:   1. Diarrhea, unspecified type  New to examiner.  Work note for days missed on 1/15/2022, 1/20/2022, and " 1/21/2022.  Encourage patient to complete entire course of antibiotics prescribed by emergency room MD for UTI.  Continue stay hydrated.    2. Carpal tunnel syndrome, bilateral upper limbs  Patient reports a history of bilateral carpal tunnel syndrome diagnosis, has not completed EMG or NCS.  Referral to neurodiagnostic for imaging.  Follow-up to review results.  - Referral to Neurodiagnostics (EEG,EP,EMG/NCS/DBS)    3. Raynaud's phenomenon without gangrene  Chronic, ongoing.  Decrease amlodipine from 2.5 mg twice daily to 2.5 mg once daily.    Follow-up: Return for Follow up Diagnostics.       Please note that this dictation was created using voice recognition software. I have worked with consultants from the vendor as well as technical experts from [employer] [facility] [employer] to optimize the interface. I have made every reasonable attempt to correct obvious errors, but I expect that there are errors of grammar and possibly content that I did not discover before finalizing the note.

## 2022-03-16 ENCOUNTER — OFFICE VISIT (OUTPATIENT)
Dept: MEDICAL GROUP | Facility: PHYSICIAN GROUP | Age: 22
End: 2022-03-16
Payer: COMMERCIAL

## 2022-03-16 VITALS
HEIGHT: 65 IN | SYSTOLIC BLOOD PRESSURE: 110 MMHG | DIASTOLIC BLOOD PRESSURE: 72 MMHG | OXYGEN SATURATION: 97 % | TEMPERATURE: 98.6 F | HEART RATE: 88 BPM | RESPIRATION RATE: 16 BRPM | BODY MASS INDEX: 23.49 KG/M2 | WEIGHT: 141 LBS

## 2022-03-16 DIAGNOSIS — I73.00 RAYNAUD'S PHENOMENON WITHOUT GANGRENE: ICD-10-CM

## 2022-03-16 DIAGNOSIS — G56.03 CARPAL TUNNEL SYNDROME, BILATERAL UPPER LIMBS: ICD-10-CM

## 2022-03-16 PROBLEM — R19.7 DIARRHEA: Status: RESOLVED | Noted: 2022-01-27 | Resolved: 2022-03-16

## 2022-03-16 PROCEDURE — 99213 OFFICE O/P EST LOW 20 MIN: CPT | Performed by: NURSE PRACTITIONER

## 2022-03-16 ASSESSMENT — FIBROSIS 4 INDEX: FIB4 SCORE: .2777777777777777778

## 2022-03-16 NOTE — ASSESSMENT & PLAN NOTE
Neck ongoing problem for the patient.  She was given an order for EMG, she was scheduled to have the study done on 3/10/2022 but missed the appointment due to a .  Continues to have numbness and tingling in bilateral hands digits 2, 3, and 4.  Continues to use bilateral wrist splints.

## 2022-03-16 NOTE — PROGRESS NOTES
CC:   Chief Complaint   Patient presents with   • Paperwork     Workers comp paperwork     HISTORY OF THE PRESENT ILLNESS: Patient is a 21 y.o. female. This pleasant patient is here today to complete Worker's Comp. paperwork, patient does not have paperwork and it should be emailed to her within 24 to 48 hours.    Health Maintenance: Reviewed    Raynaud phenomenon  Chronic, ongoing.  Patient continues to experience hot flashes and nausea while taking amlodipine 2.5 mg, states that she is unable to tolerate the side effect so she is no longer taking the medication.  With medication, cold and color changes had improved, but continues to have numbness and tingling.  She had scheduled an EMG, but missed the appointment due to a .    Carpal tunnel syndrome, bilateral upper limbs  Neck ongoing problem for the patient.  She was given an order for EMG, she was scheduled to have the study done on 3/10/2022 but missed the appointment due to a .  Continues to have numbness and tingling in bilateral hands digits 2, 3, and 4.  Continues to use bilateral wrist splints.    Allergies: Banana and Latex  Current Outpatient Medications Ordered in Epic   Medication Sig Dispense Refill   • MELATONIN PO Take  by mouth.     • cyclobenzaprine (FLEXERIL) 5 mg tablet Take 1 Tablet by mouth at bedtime as needed for Muscle Spasms. 90 Tablet 0   • naproxen (NAPROSYN) 500 MG Tab Take 1 Tablet by mouth 2 times a day with meals. 60 Tablet 11   • Omega-3 Fatty Acids (FISH OIL) 1000 MG Cap capsule Take 1,000 mg by mouth 3 times a day with meals.       No current Epic-ordered facility-administered medications on file.     Past Medical History:   Diagnosis Date   • Anxiety    • Bipolar 1 disorder (HCC)    • Depression    • Diarrhea 2022   • History of behavior problem 2018   • Hyperlipidemia    • Personal history of anaphylaxis 8/3/2018   • PTSD (post-traumatic stress disorder)      Past Surgical History:   Procedure Laterality  "Date   • DENTAL EXTRACTION(S)       Social History     Tobacco Use   • Smoking status: Never Smoker   • Smokeless tobacco: Never Used   Vaping Use   • Vaping Use: Every day   • Substances: Nicotine   Substance Use Topics   • Alcohol use: Never   • Drug use: Yes     Types: Marijuana     Comment: marijuana     Social History     Social History Narrative   • Not on file     Family History   Problem Relation Age of Onset   • Psychiatric Illness Mother    • Thyroid Father         hyperthyroid   • No Known Problems Sister    • No Known Problems Brother    • Heart Disease Maternal Grandmother         open heart   • No Known Problems Maternal Grandfather    • No Known Problems Paternal Grandmother    • No Known Problems Paternal Grandfather    • No Known Problems Brother    • No Known Problems Sister      ROS:   See HPI      Exam: /72 (BP Location: Left arm, Patient Position: Sitting, BP Cuff Size: Adult)   Pulse 88   Temp 37 °C (98.6 °F) (Temporal)   Resp 16   Ht 1.651 m (5' 5\")   Wt 64 kg (141 lb)   SpO2 97%  Body mass index is 23.46 kg/m².    General: Well nourished, well developed female in NAD, awake and conversant.  Eyes: Normal conjunctiva, anicteric.  Round symmetrical pupils.  ENT: Hearing grossly intact.  No nasal discharge.  Neck: Neck is supple.  No masses or thyromegaly.  CV: No lower extremity edema.  Respiratory: Respirations are nonlabored.  No wheezing.  Abdomen: Non-Distended.  Skin: Warm.  No rashes or ulcers.  MSK: Normal ambulation.  No clubbing or cyanosis.  Neuro: Sensation and CN II-XII grossly normal.  Psych: Alert and oriented.  Cooperative, appropriate mood and affect, normal judgment.     Assessment/Plan:  1. Raynaud's phenomenon without gangrene  2. Carpal tunnel syndrome, bilateral upper limbs  Chronic, ongoing.  Patient is unable to tolerate amlodipine due to side effects of hot flashes and nausea.  Discontinue medication.  Reschedule EMG for evaluation of carpal tunnel syndrome.  " Continue using bilateral wrist splints.  Schedule appointment once FMLA paperwork received for FMLA paperwork completion.  Schedule a separate appointment to review EMG results and treatment.  Phone number provided for scheduling EMG.    Educated in proper administration of medication(s) ordered today including safety, possible SE, risks, benefits, rationale and alternatives to therapy.   Supportive care, differential diagnoses, and indications for immediate follow-up discussed with patient.    Pathogenesis of diagnosis discussed including typical length and natural progression.    Instructed to return to clinic or nearest emergency department for any change in condition, further concerns, or worsening of symptoms.  Patient states understanding of the plan of care and discharge instructions.    Return for FMLA, separate appointment to review EMG results.    Please note that this dictation was created using voice recognition software. I have made every reasonable attempt to correct obvious errors, but I expect that there are errors of grammar and possibly content that I did not discover before finalizing the note.

## 2022-03-16 NOTE — ASSESSMENT & PLAN NOTE
Chronic, ongoing.  Patient continues to experience hot flashes and nausea while taking amlodipine 2.5 mg, states that she is unable to tolerate the side effect so she is no longer taking the medication.  With medication, cold and color changes had improved, but continues to have numbness and tingling.  She had scheduled an EMG, but missed the appointment due to a .

## 2022-04-04 ENCOUNTER — TELEMEDICINE (OUTPATIENT)
Dept: MEDICAL GROUP | Facility: PHYSICIAN GROUP | Age: 22
End: 2022-04-04
Payer: COMMERCIAL

## 2022-04-04 VITALS — BODY MASS INDEX: 18.33 KG/M2 | WEIGHT: 110 LBS | HEIGHT: 65 IN | RESPIRATION RATE: 16 BRPM

## 2022-04-04 DIAGNOSIS — I73.00 RAYNAUD'S PHENOMENON WITHOUT GANGRENE: ICD-10-CM

## 2022-04-04 DIAGNOSIS — Z02.89 ENCOUNTER FOR COMPLETION OF FORM WITH PATIENT: ICD-10-CM

## 2022-04-04 DIAGNOSIS — G56.03 CARPAL TUNNEL SYNDROME, BILATERAL UPPER LIMBS: ICD-10-CM

## 2022-04-04 DIAGNOSIS — M62.830 BACK SPASM: ICD-10-CM

## 2022-04-04 PROCEDURE — 99214 OFFICE O/P EST MOD 30 MIN: CPT | Mod: 95 | Performed by: NURSE PRACTITIONER

## 2022-04-04 ASSESSMENT — FIBROSIS 4 INDEX: FIB4 SCORE: .2916666666666666667
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
## 2022-04-28 DIAGNOSIS — M62.830 BACK SPASM: ICD-10-CM

## 2022-04-28 RX ORDER — NAPROXEN 500 MG/1
500 TABLET ORAL 2 TIMES DAILY WITH MEALS
Qty: 60 TABLET | Refills: 11 | Status: CANCELLED | OUTPATIENT
Start: 2022-04-28

## 2022-04-29 NOTE — TELEPHONE ENCOUNTER
Received request via: Pharmacy    Was the patient seen in the last year in this department? Yes    Does the patient have an active prescription (recently filled or refills available) for medication(s) requested? No       Brunilda, the pharmacy also requested refill on Naproxen.  I took this off as on 12/7/21 you ordered # 60 with one  BID and 11 refills.    The patient requested that the prescription be sent to [facility] in [place].  I do not show a [facility] in [place].  I called and sent the patient a message regarding the pharmacy.   I am waiting t hear on same.

## 2022-05-02 RX ORDER — CYCLOBENZAPRINE HCL 5 MG
5 TABLET ORAL NIGHTLY PRN
Qty: 90 TABLET | Refills: 0 | OUTPATIENT
Start: 2022-05-02

## 2022-06-09 ENCOUNTER — NON-PROVIDER VISIT (OUTPATIENT)
Dept: NEUROLOGY | Facility: MEDICAL CENTER | Age: 22
End: 2022-06-09
Attending: PSYCHIATRY & NEUROLOGY
Payer: COMMERCIAL

## 2022-06-09 DIAGNOSIS — G56.03 CARPAL TUNNEL SYNDROME, BILATERAL: ICD-10-CM

## 2022-06-09 PROCEDURE — 95885 MUSC TST DONE W/NERV TST LIM: CPT | Mod: 26 | Performed by: PSYCHIATRY & NEUROLOGY

## 2022-06-09 PROCEDURE — 95912 NRV CNDJ TEST 11-12 STUDIES: CPT | Mod: 26 | Performed by: PSYCHIATRY & NEUROLOGY

## 2022-06-09 PROCEDURE — 95885 MUSC TST DONE W/NERV TST LIM: CPT | Performed by: PSYCHIATRY & NEUROLOGY

## 2022-06-09 PROCEDURE — 95912 NRV CNDJ TEST 11-12 STUDIES: CPT | Performed by: PSYCHIATRY & NEUROLOGY
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
## 2022-07-07 ENCOUNTER — TELEMEDICINE (OUTPATIENT)
Dept: MEDICAL GROUP | Facility: PHYSICIAN GROUP | Age: 22
End: 2022-07-07
Payer: COMMERCIAL

## 2022-07-07 VITALS — BODY MASS INDEX: 22.49 KG/M2 | TEMPERATURE: 98.8 F | HEIGHT: 65 IN | WEIGHT: 135 LBS

## 2022-07-07 DIAGNOSIS — G56.03 CARPAL TUNNEL SYNDROME, BILATERAL UPPER LIMBS: ICD-10-CM

## 2022-07-07 PROCEDURE — 99213 OFFICE O/P EST LOW 20 MIN: CPT | Mod: 95 | Performed by: NURSE PRACTITIONER

## 2022-07-07 ASSESSMENT — FIBROSIS 4 INDEX: FIB4 SCORE: .2916666666666666667

## 2022-07-07 NOTE — ASSESSMENT & PLAN NOTE
Chronic, worsening, left worse than right.  Completed EMG/NCS that showed severe carpal tunnel syndrome and left and carpal tunnel in the right.  Patient does have bilateral wrist splints, but she is not using them.  Requesting referral to specialist.